# Patient Record
Sex: MALE | Race: OTHER | URBAN - METROPOLITAN AREA
[De-identification: names, ages, dates, MRNs, and addresses within clinical notes are randomized per-mention and may not be internally consistent; named-entity substitution may affect disease eponyms.]

---

## 2018-10-15 ENCOUNTER — EMERGENCY (EMERGENCY)
Facility: HOSPITAL | Age: 73
LOS: 1 days | Discharge: HOME | End: 2018-10-15
Attending: EMERGENCY MEDICINE | Admitting: EMERGENCY MEDICINE
Payer: MEDICARE

## 2018-10-15 VITALS
OXYGEN SATURATION: 97 % | WEIGHT: 149.91 LBS | HEIGHT: 66 IN | SYSTOLIC BLOOD PRESSURE: 114 MMHG | HEART RATE: 98 BPM | RESPIRATION RATE: 22 BRPM | TEMPERATURE: 98 F | DIASTOLIC BLOOD PRESSURE: 78 MMHG

## 2018-10-15 DIAGNOSIS — R04.2 HEMOPTYSIS: ICD-10-CM

## 2018-10-15 DIAGNOSIS — R06.02 SHORTNESS OF BREATH: ICD-10-CM

## 2018-10-15 PROCEDURE — 99282 EMERGENCY DEPT VISIT SF MDM: CPT

## 2018-10-15 PROCEDURE — 99283 EMERGENCY DEPT VISIT LOW MDM: CPT

## 2018-10-15 RX ORDER — IPRATROPIUM/ALBUTEROL SULFATE 18-103MCG
3 AEROSOL WITH ADAPTER (GRAM) INHALATION
Qty: 0 | Refills: 0 | Status: DISCONTINUED | OUTPATIENT
Start: 2018-10-15 | End: 2018-10-19

## 2018-10-15 RX ORDER — IPRATROPIUM/ALBUTEROL SULFATE 18-103MCG
3 AEROSOL WITH ADAPTER (GRAM) INHALATION ONCE
Qty: 0 | Refills: 0 | Status: DISCONTINUED | OUTPATIENT
Start: 2018-10-15 | End: 2018-10-15

## 2018-10-15 NOTE — ED PROVIDER NOTE - PSYCHIATRIC, MLM
Alert and oriented to person, place, time/situation.friendly but odd affect  no apparent risk to self or others.

## 2018-10-15 NOTE — ED PROVIDER NOTE - MEDICAL DECISION MAKING DETAILS
Patient with ho of sarcoid, untreated, w months of new worsening SOB and hemoptysis and chronic cough, pt w diffuse rhonchi, consider PE, consider recurrent sarcoid, bronchitis w reactive airway, consider malignancy , consider legionaires / TB , planned to put on airborn precautions and workup for all of the above however nurse just came to inform me patient walked out of ER,

## 2018-10-15 NOTE — ED PROVIDER NOTE - CONSTITUTIONAL, MLM
normal... Well appearing, thin, awake, alert, oriented to person, place, time/situation , appears distressed while coughing, + hemoptysis , small amt , no clots,

## 2018-10-15 NOTE — ED PROVIDER NOTE - MUSCULOSKELETAL, MLM
Spine appears normal, range of motion is not limited, no muscle or joint tenderness, no leg edema or leg pain